# Patient Record
Sex: MALE | Race: WHITE | NOT HISPANIC OR LATINO | Employment: FULL TIME
[De-identification: names, ages, dates, MRNs, and addresses within clinical notes are randomized per-mention and may not be internally consistent; named-entity substitution may affect disease eponyms.]

---

## 2017-05-21 ENCOUNTER — HOSPITAL (OUTPATIENT)
Dept: OTHER | Age: 32
End: 2017-05-21
Attending: FAMILY MEDICINE

## 2017-09-27 ENCOUNTER — HOSPITAL (OUTPATIENT)
Dept: OTHER | Age: 32
End: 2017-09-27
Attending: EMERGENCY MEDICINE

## 2017-10-15 ENCOUNTER — HOSPITAL (OUTPATIENT)
Dept: OTHER | Age: 32
End: 2017-10-15
Attending: FAMILY MEDICINE

## 2017-10-15 LAB
C PARVUM+HOMINIS COWP STL QL NAA+PROBE: NOT DETECTED
E HISTOLYTICA 18S RRNA STL QL NAA+PROBE: NOT DETECTED
G LAMBLIA 18S RRNA STL QL NAA+PROBE: POSITIVE

## 2017-11-17 PROBLEM — L25.9 CONTACT DERMATITIS AND ECZEMA: Status: ACTIVE | Noted: 2017-11-17

## 2018-11-17 ENCOUNTER — HOSPITAL (OUTPATIENT)
Dept: OTHER | Age: 33
End: 2018-11-17
Attending: EMERGENCY MEDICINE

## 2019-05-20 ENCOUNTER — HOSPITAL ENCOUNTER (OUTPATIENT)
Dept: ULTRASOUND IMAGING | Facility: HOSPITAL | Age: 34
Discharge: HOME | End: 2019-05-20
Attending: STUDENT IN AN ORGANIZED HEALTH CARE EDUCATION/TRAINING PROGRAM
Payer: COMMERCIAL

## 2019-05-20 ENCOUNTER — LAB ENCOUNTER (OUTPATIENT)
Dept: LAB | Facility: HOSPITAL | Age: 34
End: 2019-05-20
Attending: STUDENT IN AN ORGANIZED HEALTH CARE EDUCATION/TRAINING PROGRAM
Payer: COMMERCIAL

## 2019-05-20 DIAGNOSIS — R74.8 ACID PHOSPHATASE ELEVATED: Primary | ICD-10-CM

## 2019-05-20 DIAGNOSIS — R74.8 ELEVATED LIVER ENZYMES: ICD-10-CM

## 2019-05-20 DIAGNOSIS — Z13.220 ENCOUNTER FOR SCREENING FOR LIPOID DISORDERS: ICD-10-CM

## 2019-05-20 PROCEDURE — 86704 HEP B CORE ANTIBODY TOTAL: CPT | Performed by: STUDENT IN AN ORGANIZED HEALTH CARE EDUCATION/TRAINING PROGRAM

## 2019-05-20 PROCEDURE — 83540 ASSAY OF IRON: CPT | Performed by: STUDENT IN AN ORGANIZED HEALTH CARE EDUCATION/TRAINING PROGRAM

## 2019-05-20 PROCEDURE — 86708 HEPATITIS A ANTIBODY: CPT | Performed by: STUDENT IN AN ORGANIZED HEALTH CARE EDUCATION/TRAINING PROGRAM

## 2019-05-20 PROCEDURE — 80061 LIPID PANEL: CPT

## 2019-05-20 PROCEDURE — 82390 ASSAY OF CERULOPLASMIN: CPT

## 2019-05-20 PROCEDURE — 86706 HEP B SURFACE ANTIBODY: CPT

## 2019-05-20 PROCEDURE — 82728 ASSAY OF FERRITIN: CPT | Performed by: STUDENT IN AN ORGANIZED HEALTH CARE EDUCATION/TRAINING PROGRAM

## 2019-05-20 PROCEDURE — 36415 COLL VENOUS BLD VENIPUNCTURE: CPT | Performed by: STUDENT IN AN ORGANIZED HEALTH CARE EDUCATION/TRAINING PROGRAM

## 2019-05-20 PROCEDURE — 86038 ANTINUCLEAR ANTIBODIES: CPT | Performed by: STUDENT IN AN ORGANIZED HEALTH CARE EDUCATION/TRAINING PROGRAM

## 2019-05-20 PROCEDURE — 86235 NUCLEAR ANTIGEN ANTIBODY: CPT | Performed by: STUDENT IN AN ORGANIZED HEALTH CARE EDUCATION/TRAINING PROGRAM

## 2019-05-20 PROCEDURE — 86225 DNA ANTIBODY NATIVE: CPT | Performed by: STUDENT IN AN ORGANIZED HEALTH CARE EDUCATION/TRAINING PROGRAM

## 2019-05-20 PROCEDURE — 83550 IRON BINDING TEST: CPT | Performed by: STUDENT IN AN ORGANIZED HEALTH CARE EDUCATION/TRAINING PROGRAM

## 2019-05-20 PROCEDURE — 83516 IMMUNOASSAY NONANTIBODY: CPT

## 2019-05-20 PROCEDURE — 86803 HEPATITIS C AB TEST: CPT | Performed by: STUDENT IN AN ORGANIZED HEALTH CARE EDUCATION/TRAINING PROGRAM

## 2019-05-20 PROCEDURE — 76700 US EXAM ABDOM COMPLETE: CPT | Performed by: STUDENT IN AN ORGANIZED HEALTH CARE EDUCATION/TRAINING PROGRAM

## 2019-05-20 PROCEDURE — 87340 HEPATITIS B SURFACE AG IA: CPT | Performed by: STUDENT IN AN ORGANIZED HEALTH CARE EDUCATION/TRAINING PROGRAM

## 2019-05-20 NOTE — PROGRESS NOTES
As expected, your ultrasound shows fatty liver. This is likely related to your weight gain and poor cholesterol levels.   The only treatment for this is weight loss through diet changes and exercise, but fortunately, the inflammation and damage is reversib

## 2019-05-20 NOTE — PROGRESS NOTES
Ferritin is elevated, but iron saturation normal.  Not consistent with hereditary hemochromatosis. Elevated ferritin in setting of GODOY suggests more likely to progress to fibrosis.   No additional testing, just needs to focus on weight loss, minimize any

## 2019-05-22 NOTE — PROGRESS NOTES
Cholesterol levels are still abnormal.  LDL-- bad cholesterol levels have improved  But triglyceride levels have deteriorated. For-- Elevated LDL (bad) cholesterol--. I suggest a low cholesterol diet and regular exercise (3x/wk).        For elevated trig

## 2019-07-17 PROBLEM — R19.7 DIARRHEA, UNSPECIFIED: Status: ACTIVE | Noted: 2019-07-17

## 2019-09-11 ENCOUNTER — OFFICE VISIT (OUTPATIENT)
Dept: RHEUMATOLOGY | Facility: CLINIC | Age: 34
End: 2019-09-11
Payer: COMMERCIAL

## 2019-09-11 ENCOUNTER — APPOINTMENT (OUTPATIENT)
Dept: LAB | Age: 34
End: 2019-09-11
Attending: ORTHOPAEDIC SURGERY
Payer: COMMERCIAL

## 2019-09-11 VITALS
SYSTOLIC BLOOD PRESSURE: 110 MMHG | RESPIRATION RATE: 16 BRPM | DIASTOLIC BLOOD PRESSURE: 72 MMHG | TEMPERATURE: 98 F | HEART RATE: 64 BPM | WEIGHT: 219 LBS | BODY MASS INDEX: 30 KG/M2

## 2019-09-11 DIAGNOSIS — R76.8 DS DNA ANTIBODY POSITIVE: ICD-10-CM

## 2019-09-11 DIAGNOSIS — Z83.79 FAMILY HISTORY OF CROHN'S DISEASE: ICD-10-CM

## 2019-09-11 DIAGNOSIS — L60.8 PITTING OF NAILS: ICD-10-CM

## 2019-09-11 DIAGNOSIS — R19.7 DIARRHEA, UNSPECIFIED TYPE: ICD-10-CM

## 2019-09-11 DIAGNOSIS — R76.8 POSITIVE ANA (ANTINUCLEAR ANTIBODY): ICD-10-CM

## 2019-09-11 DIAGNOSIS — R76.8 POSITIVE ANA (ANTINUCLEAR ANTIBODY): Primary | ICD-10-CM

## 2019-09-11 LAB
C3 SERPL-MCNC: 144 MG/DL (ref 90–180)
C4 SERPL-MCNC: 27.7 MG/DL (ref 10–40)

## 2019-09-11 PROCEDURE — 86160 COMPLEMENT ANTIGEN: CPT | Performed by: INTERNAL MEDICINE

## 2019-09-11 PROCEDURE — 99244 OFF/OP CNSLTJ NEW/EST MOD 40: CPT | Performed by: INTERNAL MEDICINE

## 2019-09-11 PROCEDURE — 86038 ANTINUCLEAR ANTIBODIES: CPT | Performed by: INTERNAL MEDICINE

## 2019-09-11 PROCEDURE — 86256 FLUORESCENT ANTIBODY TITER: CPT | Performed by: INTERNAL MEDICINE

## 2019-09-11 NOTE — PROGRESS NOTES
?  RHEUMATOLOGY NEW PATIENT   Date of visit: 9/11/2019  ? Patient presents with:  Establish Care: NP ref by PCP for abnormal labs. Pt states 'blood work was ordered because was being checked for crohns, was having digestive issues.' Sister has crohns. Crohn's disease    Pitting of nails      Return in about 6 months (around 3/11/2020). ? HPI   Gray Hudson is a 35year old male with the following active problems who is referred for rheumatologic evaluation due to abnormal labs.      He was getting ev the patient from sleep. There are no symptoms of severe dry eyes (can get some dry eyes due to allergies but pt states not severe and relieved with OTC allergy eye drops) or dry mouth.    No fevers, chills, lymphadenopathy, night sweats, unexpected weight medications on file     Medications Discontinued During This Encounter  PEG 3350-KCl-NaBcb-NaCl-NaSulf (PEG *  Dicyclomine HCl 20 MG Oral Tab         ? ? Allergies:    Pollen                    ? REVIEW OF SYSTEMS   ?   Review of Systems   Constitutional tracheal deviation present. Cardiovascular: Normal rate, regular rhythm, normal heart sounds and intact distal pulses. No murmur heard. Pulmonary/Chest: Effort normal and breath sounds normal. No respiratory distress. He has no wheezes.    Abdominal: S (H) 06/17/2011    EOABSO 0.2 06/17/2011    BAABSO 0.0 06/17/2011     Lab Results   Component Value Date     (H) 04/18/2019    BUN 12 04/18/2019    CREATSERUM 0.82 04/18/2019    GFRNAA 116 04/18/2019    GFRAA 135 04/18/2019    CA 9.7 04/18/2019    AL

## 2019-09-30 ENCOUNTER — TELEPHONE (OUTPATIENT)
Dept: RHEUMATOLOGY | Facility: CLINIC | Age: 34
End: 2019-09-30

## 2019-09-30 DIAGNOSIS — Z83.79 FAMILY HISTORY OF CROHN'S DISEASE: ICD-10-CM

## 2019-09-30 DIAGNOSIS — R76.8 POSITIVE ANA (ANTINUCLEAR ANTIBODY): Primary | ICD-10-CM

## 2019-09-30 NOTE — TELEPHONE ENCOUNTER
LVM for pt to call back to discuss results. Whitney Handy DO  [facility]  9/30/2019        LVM again for pt to call back to discuss results. Whitney Handy DO  [facility]  10/1/2019    Called patient again.   Discussed test results at [facility]

## 2019-12-30 ENCOUNTER — HOSPITAL (OUTPATIENT)
Dept: OTHER | Age: 34
End: 2019-12-30
Attending: EMERGENCY MEDICINE

## 2020-02-26 ENCOUNTER — WALK IN (OUTPATIENT)
Dept: URGENT CARE | Age: 35
End: 2020-02-26
Attending: EMERGENCY MEDICINE

## 2020-02-26 DIAGNOSIS — L03.011 PARONYCHIA OF FINGER OF RIGHT HAND: Primary | ICD-10-CM

## 2020-02-26 PROCEDURE — 10060 I&D ABSCESS SIMPLE/SINGLE: CPT

## 2020-02-26 PROCEDURE — 99212 OFFICE O/P EST SF 10 MIN: CPT

## 2020-02-26 ASSESSMENT — PAIN SCALES - GENERAL: PAINLEVEL: 3-4

## 2020-02-26 ASSESSMENT — ENCOUNTER SYMPTOMS
HEMATOLOGIC/LYMPHATIC NEGATIVE: 1
FEVER: 0
GASTROINTESTINAL NEGATIVE: 1
CHILLS: 0
RESPIRATORY NEGATIVE: 1

## 2020-05-06 PROBLEM — Z30.2 ENCOUNTER FOR VASECTOMY: Status: ACTIVE | Noted: 2020-05-06

## 2020-05-22 ENCOUNTER — OFFICE VISIT (OUTPATIENT)
Dept: PODIATRY CLINIC | Facility: CLINIC | Age: 35
End: 2020-05-22
Payer: COMMERCIAL

## 2020-05-22 VITALS — TEMPERATURE: 98 F

## 2020-05-22 DIAGNOSIS — M79.672 FOOT PAIN, LEFT: Primary | ICD-10-CM

## 2020-05-22 DIAGNOSIS — M76.72 PERONEAL TENDINITIS OF LOWER LEG, LEFT: ICD-10-CM

## 2020-05-22 PROCEDURE — 99213 OFFICE O/P EST LOW 20 MIN: CPT | Performed by: PODIATRIST

## 2020-05-26 NOTE — PROGRESS NOTES
Brissa Mcintyre is a 29year old male. Patient presents with: Foot Pain: C/o left foot pain for past couple of weeks, has gotten worse the last week. No swelling. Patient states he fractured left ankle at 15years old.          HPI:   This pleasant patient Never Used    Substance and Sexual Activity      Alcohol use:  Yes        Alcohol/week: 3.0 standard drinks        Types: 3 Shots of liquor per week        Comment: Weekly; 2-3 drinks      Drug use: No    Other Topics      Concerns:        Caffeine Concern:

## 2020-06-19 ENCOUNTER — TELEPHONE (OUTPATIENT)
Dept: PODIATRY CLINIC | Facility: CLINIC | Age: 35
End: 2020-06-19

## 2020-06-19 DIAGNOSIS — M76.72 PERONEAL TENDINITIS, LEFT: Primary | ICD-10-CM

## 2020-06-19 NOTE — TELEPHONE ENCOUNTER
I told the patient the results of his diagnostic ultrasound and prescribed physical therapy 2 times weekly for 4 weeks. Confirms that he has Promius longus tendinitis in the cuboidal groove. This is on the left foot.   I will see him again in follow-up fo

## 2020-07-02 ENCOUNTER — APPOINTMENT (OUTPATIENT)
Dept: PHYSICAL THERAPY | Age: 35
End: 2020-07-02
Attending: PODIATRIST
Payer: COMMERCIAL

## 2020-07-07 ENCOUNTER — OFFICE VISIT (OUTPATIENT)
Dept: PHYSICAL THERAPY | Age: 35
End: 2020-07-07
Attending: PODIATRIST
Payer: COMMERCIAL

## 2020-07-07 PROCEDURE — 97161 PT EVAL LOW COMPLEX 20 MIN: CPT

## 2020-07-07 PROCEDURE — 97110 THERAPEUTIC EXERCISES: CPT

## 2020-07-07 NOTE — PROGRESS NOTES
LOWER EXTREMITY EVALUATION:   Referring Physician: Dr. Cinthya Huynh DX:  Peroneal tendinitis, left (M76.72)       PATIENT SUMMARY   Olivia Jung is a 29year old y/o male who presents to therapy today with the below symptom presentation.   Present benefit from skilled Physical Therapy to address the above impairments to return to prior level of function.       OBJECTIVE:     Gait: No gait impairments  Palpation: tenderness to palpation (plantar foot)  Edema: no edema noted  Flexibility:  Decreased ga precautions, and treatment options and has agreed to actively participate in planning and for this course of care. Thank you for your referral. Please co-sign or sign and return this letter via fax as soon as possible to [phone].  If you have any qu

## 2020-07-09 ENCOUNTER — OFFICE VISIT (OUTPATIENT)
Dept: PHYSICAL THERAPY | Age: 35
End: 2020-07-09
Attending: PODIATRIST
Payer: COMMERCIAL

## 2020-07-09 PROCEDURE — 97110 THERAPEUTIC EXERCISES: CPT

## 2020-07-09 NOTE — PROGRESS NOTES
Associated DX:  Peroneal tendinitis, left (M76.72)      Insurance (Authorized # of Visits):  PPO           Authorizing Physician: Dr. Casey Nguyễn Next MD visit: none scheduled  Fall Risk: standard         Precautions: n/a             Subjective: Patient states

## 2020-07-14 ENCOUNTER — APPOINTMENT (OUTPATIENT)
Dept: PHYSICAL THERAPY | Age: 35
End: 2020-07-14
Attending: PODIATRIST
Payer: COMMERCIAL

## 2020-07-14 ENCOUNTER — TELEPHONE (OUTPATIENT)
Dept: PHYSICAL THERAPY | Age: 35
End: 2020-07-14

## 2020-07-16 ENCOUNTER — OFFICE VISIT (OUTPATIENT)
Dept: PHYSICAL THERAPY | Age: 35
End: 2020-07-16
Attending: PODIATRIST
Payer: COMMERCIAL

## 2020-07-16 PROCEDURE — 97110 THERAPEUTIC EXERCISES: CPT

## 2020-07-16 NOTE — PROGRESS NOTES
Discharge summary  Associated DX:  Peroneal tendinitis, left (M76.72)      Insurance (Authorized # of Visits):  PPO           Authorizing Physician: Dr. Newt Fleischer Next MD visit: none scheduled  Fall Risk: standard         Precautions: n/a             Subject

## 2020-07-23 ENCOUNTER — APPOINTMENT (OUTPATIENT)
Dept: PHYSICAL THERAPY | Age: 35
End: 2020-07-23
Attending: PODIATRIST
Payer: COMMERCIAL

## 2020-07-28 ENCOUNTER — APPOINTMENT (OUTPATIENT)
Dept: PHYSICAL THERAPY | Age: 35
End: 2020-07-28
Attending: PODIATRIST
Payer: COMMERCIAL

## 2020-07-30 ENCOUNTER — APPOINTMENT (OUTPATIENT)
Dept: PHYSICAL THERAPY | Age: 35
End: 2020-07-30
Attending: PODIATRIST
Payer: COMMERCIAL

## 2020-09-01 ENCOUNTER — WALK IN (OUTPATIENT)
Dept: URGENT CARE | Age: 35
End: 2020-09-01
Attending: FAMILY MEDICINE

## 2020-09-01 DIAGNOSIS — R21 RASH: Primary | ICD-10-CM

## 2020-09-01 PROCEDURE — 99212 OFFICE O/P EST SF 10 MIN: CPT

## 2020-09-01 RX ORDER — PREDNISONE 10 MG/1
TABLET ORAL
Qty: 30 TABLET | Refills: 0 | Status: SHIPPED | OUTPATIENT
Start: 2020-09-01

## 2020-09-01 RX ORDER — MOMETASONE FUROATE 50 UG/1
1 SPRAY, METERED NASAL
COMMUNITY

## 2020-09-01 SDOH — HEALTH STABILITY: MENTAL HEALTH: HOW OFTEN DO YOU HAVE A DRINK CONTAINING ALCOHOL?: 2-3 TIMES A WEEK

## 2020-09-01 ASSESSMENT — ENCOUNTER SYMPTOMS
WHEEZING: 0
SHORTNESS OF BREATH: 0
FEVER: 0
DIZZINESS: 0
CHILLS: 0
COUGH: 0
VOMITING: 0
ADENOPATHY: 0
DIARRHEA: 0
ABDOMINAL PAIN: 0
AGITATION: 0
SORE THROAT: 0
NAUSEA: 0
WEAKNESS: 0
SINUS PRESSURE: 0

## 2020-09-01 ASSESSMENT — PAIN SCALES - GENERAL: PAINLEVEL: 0

## 2021-04-19 ENCOUNTER — IMMUNIZATION (OUTPATIENT)
Dept: LAB | Facility: HOSPITAL | Age: 36
End: 2021-04-19
Attending: EMERGENCY MEDICINE
Payer: COMMERCIAL

## 2021-04-19 DIAGNOSIS — Z23 NEED FOR VACCINATION: Primary | ICD-10-CM

## 2021-04-19 PROCEDURE — 0011A SARSCOV2 VAC 100MCG/0.5ML IM: CPT

## 2021-05-17 ENCOUNTER — IMMUNIZATION (OUTPATIENT)
Dept: LAB | Facility: HOSPITAL | Age: 36
End: 2021-05-17
Attending: EMERGENCY MEDICINE
Payer: COMMERCIAL

## 2021-05-17 DIAGNOSIS — Z23 NEED FOR VACCINATION: Primary | ICD-10-CM

## 2021-05-17 PROCEDURE — 0012A SARSCOV2 VAC 100MCG/0.5ML IM: CPT

## 2021-05-26 VITALS
DIASTOLIC BLOOD PRESSURE: 93 MMHG | DIASTOLIC BLOOD PRESSURE: 96 MMHG | HEART RATE: 83 BPM | SYSTOLIC BLOOD PRESSURE: 137 MMHG | RESPIRATION RATE: 16 BRPM | WEIGHT: 220 LBS | OXYGEN SATURATION: 99 % | TEMPERATURE: 97.7 F | TEMPERATURE: 98.1 F | HEART RATE: 81 BPM | SYSTOLIC BLOOD PRESSURE: 144 MMHG

## 2021-10-22 ENCOUNTER — OFFICE VISIT (OUTPATIENT)
Dept: RHEUMATOLOGY | Facility: CLINIC | Age: 36
End: 2021-10-22
Payer: COMMERCIAL

## 2021-10-22 VITALS
WEIGHT: 225 LBS | BODY MASS INDEX: 30.48 KG/M2 | OXYGEN SATURATION: 97 % | DIASTOLIC BLOOD PRESSURE: 88 MMHG | SYSTOLIC BLOOD PRESSURE: 140 MMHG | RESPIRATION RATE: 16 BRPM | HEIGHT: 72 IN | TEMPERATURE: 97 F | HEART RATE: 72 BPM

## 2021-10-22 DIAGNOSIS — R76.8 DS DNA ANTIBODY POSITIVE: ICD-10-CM

## 2021-10-22 DIAGNOSIS — R76.8 ANA POSITIVE: Primary | ICD-10-CM

## 2021-10-22 DIAGNOSIS — K76.0 FATTY LIVER: ICD-10-CM

## 2021-10-22 DIAGNOSIS — Z83.79 FAMILY HISTORY OF CROHN'S DISEASE: ICD-10-CM

## 2021-10-22 DIAGNOSIS — R79.89 ABNORMAL LFTS: ICD-10-CM

## 2021-10-22 PROCEDURE — 3079F DIAST BP 80-89 MM HG: CPT | Performed by: INTERNAL MEDICINE

## 2021-10-22 PROCEDURE — 3077F SYST BP >= 140 MM HG: CPT | Performed by: INTERNAL MEDICINE

## 2021-10-22 PROCEDURE — 99214 OFFICE O/P EST MOD 30 MIN: CPT | Performed by: INTERNAL MEDICINE

## 2021-10-22 PROCEDURE — 3008F BODY MASS INDEX DOCD: CPT | Performed by: INTERNAL MEDICINE

## 2021-10-22 NOTE — PROGRESS NOTES
RHEUMATOLOGY FOLLOW UP   Date of visit: 10/22/2021  ? Patient presents with: Follow - Up: 2 year f/u. Feeling fine. No joint pain. No rashes. No joint swelling. No new symptoms.   ?  ASSESSMENT, DISCUSSION & PLAN   Assessment:  CARRIE positive  (primary en with PCP later today and will discuss with her. Pt will come back and have AVISE done. I will call with results. Will likely be okay to follow up in 1-2 years or sooner if symptoms developed.    Reminded pt that antibodies can be positive 5-7 years befor intermittent dry eyes more attributed allergies  Denies significant dry mouth  Denies oral or nasal ulcerations  Denies hematologic or renal issues. Still has ongoing GI issues- has \"inconstentency in stool pattern\".  Has frequent stools that can be loo on the fingertips. The patient denies any history of uveitis.  Denies nodular painful shin bruises, Achilles heel pain or symptoms of enthesitis, psoriatic lesions, spooning or pitting of the nails, history of dactylitis, or pain awakening the patient from Comment: Weekly; 2-3 drinks    Drug use: No    Medications:  lisinopril 20 MG Oral Tab, Take 1 tablet (20 mg total) by mouth daily. , Disp: 90 tablet, Rfl: 3  Loratadine (CLARITIN OR), Take by mouth., Disp: , Rfl:   Mometasone Furoate 50 MCG/ACT Nasal Suspe diaphoretic. HENT:      Head: Normocephalic. Right Ear: External ear normal.      Left Ear: External ear normal.      Nose: Nose normal.      Mouth/Throat:      Pharynx: No oropharyngeal exudate. Eyes:      General: No scleral icterus.      Conjunc 10/22/2021     10/22/2021    MPV 10.6 (H) 06/17/2011    MCV 96.0 10/22/2021    MCH 33.7 (H) 10/22/2021    MCHC 35.1 10/22/2021    RDW 12.1 10/22/2021    NEUTABS 5,510 04/18/2019    LYMPHABS 1,966 04/18/2019    EOSABS 202 04/18/2019    BASABS 34 04/1

## 2021-12-06 ENCOUNTER — OFFICE VISIT (OUTPATIENT)
Dept: SURGERY | Facility: CLINIC | Age: 36
End: 2021-12-06
Payer: COMMERCIAL

## 2021-12-06 VITALS
HEART RATE: 90 BPM | SYSTOLIC BLOOD PRESSURE: 141 MMHG | RESPIRATION RATE: 16 BRPM | DIASTOLIC BLOOD PRESSURE: 98 MMHG | OXYGEN SATURATION: 97 % | WEIGHT: 218 LBS | BODY MASS INDEX: 30 KG/M2

## 2021-12-06 DIAGNOSIS — K76.0 FATTY LIVER: ICD-10-CM

## 2021-12-06 DIAGNOSIS — R79.89 ELEVATED LIVER FUNCTION TESTS: Primary | ICD-10-CM

## 2021-12-06 NOTE — PROGRESS NOTES
[facility]  [address], [address]  [address]  [facility]  [phone] ([phone]) per week      Comment: Weekly; 2-3 drinks    Drug use: No         Current Outpatient Medications:   •  lisinopril 20 MG Oral Tab, Take 1 tablet (20 mg total) by mouth daily. , Disp: 90 tablet, Rfl: 3  •  Loratadine (CLARITIN OR), Take by mouth., Disp: , Rfl

## (undated) NOTE — Clinical Note
Hi Dr. Tess Lloyd,  Here's an update on Amolormsstadona. I saw him before he had his appt with you.   Hope you're doing well!  - Georgiann Claude

## (undated) NOTE — Clinical Note
Hi, Dr. Sharona Bettencourt! Thank you for referring Mr. Olivia Jung for rheumatologic evaluation. Please see the discussion portion of my note and let me know if you have any questions.  CARLOS ALBERTO Rodriguez Rheumatology9/11/2019